# Patient Record
Sex: MALE | Race: BLACK OR AFRICAN AMERICAN | NOT HISPANIC OR LATINO | Employment: UNEMPLOYED | URBAN - METROPOLITAN AREA
[De-identification: names, ages, dates, MRNs, and addresses within clinical notes are randomized per-mention and may not be internally consistent; named-entity substitution may affect disease eponyms.]

---

## 2019-01-08 ENCOUNTER — HISTORICAL (OUTPATIENT)
Dept: RADIOLOGY | Facility: HOSPITAL | Age: 64
End: 2019-01-08

## 2019-05-22 ENCOUNTER — HISTORICAL (OUTPATIENT)
Dept: LAB | Facility: HOSPITAL | Age: 64
End: 2019-05-22

## 2019-05-22 LAB
ABS NEUT (OLG): 2.89 X10(3)/MCL (ref 2.1–9.2)
BASOPHILS # BLD AUTO: 0 X10(3)/MCL (ref 0–0.2)
BASOPHILS NFR BLD AUTO: 0 %
BUN SERPL-MCNC: 8.2 MG/DL (ref 7–18)
CALCIUM SERPL-MCNC: 9.4 MG/DL (ref 8.5–10.1)
CHLORIDE SERPL-SCNC: 104 MMOL/L (ref 98–107)
CO2 SERPL-SCNC: 27.4 MMOL/L (ref 21–32)
CREAT SERPL-MCNC: 0.8 MG/DL (ref 0.6–1.3)
CREAT/UREA NIT SERPL: 10
EOSINOPHIL # BLD AUTO: 0.2 X10(3)/MCL (ref 0–0.9)
EOSINOPHIL NFR BLD AUTO: 3 %
ERYTHROCYTE [DISTWIDTH] IN BLOOD BY AUTOMATED COUNT: 11.6 % (ref 11.5–17)
GLUCOSE SERPL-MCNC: 104 MG/DL (ref 74–106)
HCT VFR BLD AUTO: 39.7 % (ref 42–52)
HGB BLD-MCNC: 13.1 GM/DL (ref 14–18)
IMM GRANULOCYTES # BLD AUTO: 0.01 % (ref 0–0.02)
IMM GRANULOCYTES NFR BLD AUTO: 0.2 % (ref 0–0.43)
INR PPP: 0.92 (ref 2–3)
LYMPHOCYTES # BLD AUTO: 2.2 X10(3)/MCL (ref 0.6–4.6)
LYMPHOCYTES NFR BLD AUTO: 37 %
MCH RBC QN AUTO: 29.5 PG (ref 27–31)
MCHC RBC AUTO-ENTMCNC: 33 GM/DL (ref 33–36)
MCV RBC AUTO: 89.4 FL (ref 80–94)
MONOCYTES # BLD AUTO: 0.6 X10(3)/MCL (ref 0.1–1.3)
MONOCYTES NFR BLD AUTO: 11 %
NEUTROPHILS # BLD AUTO: 2.89 X10(3)/MCL (ref 1.4–7.9)
NEUTROPHILS NFR BLD AUTO: 48 %
PLATELET # BLD AUTO: 269 X10(3)/MCL (ref 130–400)
PMV BLD AUTO: 10.5 FL (ref 9.4–12.4)
POTASSIUM SERPL-SCNC: 4 MMOL/L (ref 3.5–5.1)
PROTHROMBIN TIME: 9.8 SECOND(S) (ref 9.3–11.4)
RBC # BLD AUTO: 4.44 X10(6)/MCL (ref 4.7–6.1)
SODIUM SERPL-SCNC: 140 MMOL/L (ref 136–145)
WBC # SPEC AUTO: 6 X10(3)/MCL (ref 4.5–11.5)

## 2019-06-10 ENCOUNTER — HISTORICAL (OUTPATIENT)
Dept: LAB | Facility: HOSPITAL | Age: 64
End: 2019-06-10

## 2019-06-10 LAB
ABS NEUT (OLG): 3.71 X10(3)/MCL (ref 2.1–9.2)
APTT PPP: 27.1 SECOND(S) (ref 24.5–34.9)
BASOPHILS # BLD AUTO: 0 X10(3)/MCL (ref 0–0.2)
BASOPHILS NFR BLD AUTO: 1 %
BUN SERPL-MCNC: 4.4 MG/DL (ref 7–18)
CALCIUM SERPL-MCNC: 9 MG/DL (ref 8.5–10.1)
CHLORIDE SERPL-SCNC: 102 MMOL/L (ref 98–107)
CO2 SERPL-SCNC: 27.4 MMOL/L (ref 21–32)
CREAT SERPL-MCNC: 0.69 MG/DL (ref 0.6–1.3)
CREAT/UREA NIT SERPL: 6
EOSINOPHIL # BLD AUTO: 0.2 X10(3)/MCL (ref 0–0.9)
EOSINOPHIL NFR BLD AUTO: 3 %
ERYTHROCYTE [DISTWIDTH] IN BLOOD BY AUTOMATED COUNT: 11.7 % (ref 11.5–17)
GLUCOSE SERPL-MCNC: 100 MG/DL (ref 74–106)
HCT VFR BLD AUTO: 37.1 % (ref 42–52)
HGB BLD-MCNC: 12.4 GM/DL (ref 14–18)
IMM GRANULOCYTES # BLD AUTO: 0.01 % (ref 0–0.02)
IMM GRANULOCYTES NFR BLD AUTO: 0.2 % (ref 0–0.43)
INR PPP: 0.89 (ref 2–3)
LYMPHOCYTES # BLD AUTO: 1.7 X10(3)/MCL (ref 0.6–4.6)
LYMPHOCYTES NFR BLD AUTO: 26 %
MCH RBC QN AUTO: 30 PG (ref 27–31)
MCHC RBC AUTO-ENTMCNC: 33.4 GM/DL (ref 33–36)
MCV RBC AUTO: 89.8 FL (ref 80–94)
MONOCYTES # BLD AUTO: 0.8 X10(3)/MCL (ref 0.1–1.3)
MONOCYTES NFR BLD AUTO: 12 %
NEUTROPHILS # BLD AUTO: 3.71 X10(3)/MCL (ref 1.4–7.9)
NEUTROPHILS NFR BLD AUTO: 57 %
PLATELET # BLD AUTO: 217 X10(3)/MCL (ref 130–400)
PMV BLD AUTO: 12 FL (ref 9.4–12.4)
POTASSIUM SERPL-SCNC: 3.6 MMOL/L (ref 3.5–5.1)
PROTHROMBIN TIME: 9.4 SECOND(S) (ref 9.3–11.4)
RBC # BLD AUTO: 4.13 X10(6)/MCL (ref 4.7–6.1)
SODIUM SERPL-SCNC: 141 MMOL/L (ref 136–145)
WBC # SPEC AUTO: 6.5 X10(3)/MCL (ref 4.5–11.5)

## 2020-03-03 ENCOUNTER — HISTORICAL (OUTPATIENT)
Dept: ADMINISTRATIVE | Facility: HOSPITAL | Age: 65
End: 2020-03-03

## 2020-03-09 ENCOUNTER — HISTORICAL (OUTPATIENT)
Dept: ADMINISTRATIVE | Facility: HOSPITAL | Age: 65
End: 2020-03-09

## 2021-03-31 ENCOUNTER — HISTORICAL (OUTPATIENT)
Dept: RADIOLOGY | Facility: HOSPITAL | Age: 66
End: 2021-03-31

## 2021-03-31 LAB
BUN SERPL-MCNC: 11.2 MG/DL (ref 8.4–25.7)
CREAT SERPL-MCNC: 0.82 MG/DL (ref 0.73–1.18)

## 2022-04-11 ENCOUNTER — HISTORICAL (OUTPATIENT)
Dept: ADMINISTRATIVE | Facility: HOSPITAL | Age: 67
End: 2022-04-11

## 2022-04-24 VITALS
SYSTOLIC BLOOD PRESSURE: 121 MMHG | DIASTOLIC BLOOD PRESSURE: 69 MMHG | BODY MASS INDEX: 32.58 KG/M2 | HEIGHT: 65 IN | WEIGHT: 195.56 LBS

## 2023-05-16 DIAGNOSIS — E27.9 DISORDER OF ADRENAL GLAND, UNSPECIFIED: Primary | ICD-10-CM

## 2023-05-23 ENCOUNTER — HOSPITAL ENCOUNTER (OUTPATIENT)
Dept: RADIOLOGY | Facility: HOSPITAL | Age: 68
Discharge: HOME | End: 2023-05-23
Attending: INTERNAL MEDICINE
Payer: OTHER GOVERNMENT

## 2023-05-23 DIAGNOSIS — E27.9 DISORDER OF ADRENAL GLAND, UNSPECIFIED: ICD-10-CM

## 2023-05-23 PROCEDURE — A9577 INJ MULTIHANCE: HCPCS

## 2023-05-23 PROCEDURE — 74183 MRI ABD W/O CNTR FLWD CNTR: CPT | Mod: TC

## 2023-05-23 PROCEDURE — 25500020 PHARM REV CODE 255

## 2023-05-23 RX ADMIN — GADOBENATE DIMEGLUMINE 20 ML: 529 INJECTION, SOLUTION INTRAVENOUS at 09:05

## 2024-04-24 DIAGNOSIS — Z87.891 HISTORY OF TOBACCO USE: Primary | ICD-10-CM

## 2024-05-10 ENCOUNTER — HOSPITAL ENCOUNTER (OUTPATIENT)
Dept: RADIOLOGY | Facility: HOSPITAL | Age: 69
Discharge: HOME | End: 2024-05-10
Attending: HOSPITALIST
Payer: OTHER GOVERNMENT

## 2024-05-10 DIAGNOSIS — Z87.891 HISTORY OF TOBACCO USE: ICD-10-CM

## 2024-05-10 PROCEDURE — 71271 CT THORAX LUNG CANCER SCR C-: CPT | Mod: TC

## 2024-11-02 RX ORDER — ASPIRIN 81 MG/1
81 TABLET ORAL DAILY
COMMUNITY
Start: 2024-04-19

## 2024-11-02 RX ORDER — LISINOPRIL 40 MG/1
40 TABLET ORAL DAILY
COMMUNITY
Start: 2024-04-19

## 2024-11-02 RX ORDER — AMLODIPINE BESYLATE 10 MG/1
10 TABLET ORAL DAILY
COMMUNITY
Start: 2024-04-19

## 2024-11-06 ENCOUNTER — ANESTHESIA EVENT (OUTPATIENT)
Facility: HOSPITAL | Age: 69
End: 2024-11-06
Payer: OTHER GOVERNMENT

## 2024-11-06 ENCOUNTER — ANESTHESIA (OUTPATIENT)
Facility: HOSPITAL | Age: 69
End: 2024-11-06
Payer: OTHER GOVERNMENT

## 2024-11-06 ENCOUNTER — HOSPITAL ENCOUNTER (OUTPATIENT)
Facility: HOSPITAL | Age: 69
Discharge: HOME | End: 2024-11-06
Attending: OPHTHALMOLOGY | Admitting: OPHTHALMOLOGY
Payer: OTHER GOVERNMENT

## 2024-11-06 VITALS
BODY MASS INDEX: 33.66 KG/M2 | HEART RATE: 64 BPM | TEMPERATURE: 98 F | DIASTOLIC BLOOD PRESSURE: 71 MMHG | HEIGHT: 65 IN | SYSTOLIC BLOOD PRESSURE: 134 MMHG | WEIGHT: 202 LBS | OXYGEN SATURATION: 98 %

## 2024-11-06 DIAGNOSIS — H26.9 CATARACT: ICD-10-CM

## 2024-11-06 PROBLEM — H25.12 AGE-RELATED NUCLEAR CATARACT OF LEFT EYE: Status: ACTIVE | Noted: 2024-11-06

## 2024-11-06 LAB — KOH PREP SPEC: ABNORMAL

## 2024-11-06 PROCEDURE — 37000008 HC ANESTHESIA 1ST 15 MINUTES: Performed by: OPHTHALMOLOGY

## 2024-11-06 PROCEDURE — 87220 TISSUE EXAM FOR FUNGI: CPT | Performed by: OPHTHALMOLOGY

## 2024-11-06 PROCEDURE — 71000015 HC POSTOP RECOV 1ST HR: Performed by: OPHTHALMOLOGY

## 2024-11-06 PROCEDURE — 36000706: Performed by: OPHTHALMOLOGY

## 2024-11-06 PROCEDURE — 25000003 PHARM REV CODE 250

## 2024-11-06 PROCEDURE — 63600175 PHARM REV CODE 636 W HCPCS: Performed by: NURSE ANESTHETIST, CERTIFIED REGISTERED

## 2024-11-06 PROCEDURE — 37000009 HC ANESTHESIA EA ADD 15 MINS: Performed by: OPHTHALMOLOGY

## 2024-11-06 PROCEDURE — V2632 POST CHMBR INTRAOCULAR LENS: HCPCS | Performed by: OPHTHALMOLOGY

## 2024-11-06 PROCEDURE — 25000003 PHARM REV CODE 250: Performed by: OPHTHALMOLOGY

## 2024-11-06 PROCEDURE — 36000707: Performed by: OPHTHALMOLOGY

## 2024-11-06 PROCEDURE — 27201423 OPTIME MED/SURG SUP & DEVICES STERILE SUPPLY: Performed by: OPHTHALMOLOGY

## 2024-11-06 PROCEDURE — 63600175 PHARM REV CODE 636 W HCPCS: Performed by: OPHTHALMOLOGY

## 2024-11-06 DEVICE — TECNIS 1-PC CLEAR MONO 6.0MM 21.5D
Type: IMPLANTABLE DEVICE | Site: EYE | Status: FUNCTIONAL
Brand: TECNIS IOL

## 2024-11-06 RX ORDER — PHENYLEPHRINE HYDROCHLORIDE 100 MG/ML
1 SOLUTION/ DROPS OPHTHALMIC
Status: COMPLETED | OUTPATIENT
Start: 2024-11-06 | End: 2024-11-06

## 2024-11-06 RX ORDER — MIDAZOLAM HYDROCHLORIDE 1 MG/ML
INJECTION INTRAMUSCULAR; INTRAVENOUS
Status: DISCONTINUED | OUTPATIENT
Start: 2024-11-06 | End: 2024-11-06

## 2024-11-06 RX ORDER — TROPICAMIDE 10 MG/ML
1 SOLUTION/ DROPS OPHTHALMIC
Status: COMPLETED | OUTPATIENT
Start: 2024-11-06 | End: 2024-11-06

## 2024-11-06 RX ORDER — EPINEPHRINE 1 MG/ML
INJECTION, SOLUTION, CONCENTRATE INTRAVENOUS
Status: DISCONTINUED | OUTPATIENT
Start: 2024-11-06 | End: 2024-11-06 | Stop reason: HOSPADM

## 2024-11-06 RX ORDER — BUPIVACAINE HYDROCHLORIDE 7.5 MG/ML
INJECTION, SOLUTION EPIDURAL; RETROBULBAR
Status: DISCONTINUED | OUTPATIENT
Start: 2024-11-06 | End: 2024-11-06 | Stop reason: HOSPADM

## 2024-11-06 RX ORDER — LIDOCAINE HYDROCHLORIDE 10 MG/ML
INJECTION, SOLUTION EPIDURAL; INFILTRATION; INTRACAUDAL; PERINEURAL
Status: DISCONTINUED | OUTPATIENT
Start: 2024-11-06 | End: 2024-11-06 | Stop reason: HOSPADM

## 2024-11-06 RX ORDER — POVIDONE-IODINE 5 %
SOLUTION, NON-ORAL OPHTHALMIC (EYE)
Status: DISCONTINUED | OUTPATIENT
Start: 2024-11-06 | End: 2024-11-06 | Stop reason: HOSPADM

## 2024-11-06 RX ORDER — BUPIVACAINE HYDROCHLORIDE 7.5 MG/ML
0.1 INJECTION, SOLUTION EPIDURAL; RETROBULBAR
Status: COMPLETED | OUTPATIENT
Start: 2024-11-06 | End: 2024-11-06

## 2024-11-06 RX ADMIN — BUPIVACAINE HYDROCHLORIDE 0.75 MG: 7.5 INJECTION, SOLUTION EPIDURAL; RETROBULBAR at 08:11

## 2024-11-06 RX ADMIN — TROPICAMIDE 1 DROP: 10 SOLUTION/ DROPS OPHTHALMIC at 08:11

## 2024-11-06 RX ADMIN — PHENYLEPHRINE HYDROCHLORIDE 1 DROP: 100 SOLUTION/ DROPS OPHTHALMIC at 08:11

## 2024-11-06 RX ADMIN — MIDAZOLAM 2 MG: 1 INJECTION INTRAMUSCULAR; INTRAVENOUS at 10:11

## 2024-11-06 NOTE — OP NOTE
Date of Procedure: 11/6/2024     Procedure: Procedure(s) (LRB):  PHACOEMULSIFICATION, CATARACT, WITH IOL INSERTION  ////left eye (Left)     Surgeons and Role:     * Mor Miller II, MD - Primary    Pre-Operative Diagnosis: Nuclear sclerotic cataract of left eye [H25.12]    Post-Operative Diagnosis: Post-Op Diagnosis Codes:     * Nuclear sclerotic cataract of left eye [H25.12]    Anesthesia: Monitor Anesthesia Care    Operative Findings (including complications, if any): None    Description of Technical Procedures:     Dilating drops were given in the holding area. The patient was brought into the surgical suite, identified and correct eye confirmed. Topical anesthesia was applied. The eye was then prepped and draped in a sterile fashion. A supersharp blade was used to make a paracentesis at the 2 o'clock position. 1/2 CC of 2% Lidocaine + 1/2 CC BSS was injected into AC thru cannula. Viscoelastic was placed in the anterior chamber. A clear corneal incision was made at the 3 o'clock position with a keratome blade. Next, a cystotome and utrata forceps were used to make a 360 degree capsulorrhexis. BSS thru a cannula was used to hydro dissect and rotate the lens material from the capsule. The phaco handpiece was placed into the anterior chamber and the lens was removed in a divide an conquer fashion. The remaining cortex was removed with the I & A handpiece. Viscoelastic was placed into the capsular bag, which remained clear and intact. An IOL was placed in the bag and rotated into position. The remaining viscoelastic was removed with the I&A handpiece. The incision was hydrated with BSS and checked for leakage. No leakage was found. The drapes were removed and antibiotic drops were placed into the eye. The patient tolerated the procedure well and was moved back to the holding room. Sunglasses and instructions were given to the patient and family. The patient will follow-up at my office tomorrow.    EFX:7.3  Lens:  21.5 [id number]

## 2024-11-06 NOTE — ANESTHESIA PREPROCEDURE EVALUATION
11/06/2024  Corona Moon is a 69 y.o., male.      Pre-op Assessment    I have reviewed the Patient Summary Reports.     I have reviewed the Nursing Notes. I have reviewed the NPO Status.   I have reviewed the Medications.     Review of Systems  Anesthesia Hx:               Denies Personal Hx of Anesthesia complications.                    Cardiovascular:     Hypertension                                    Hypertension         Pulmonary:        Sleep Apnea     Obstructive Sleep Apnea (PETRA).           Hepatic/GI:  Hepatic/GI Normal                    Neurological:  Neurology Normal                                      Endocrine:        Obesity / BMI > 30  Psych:  Psychiatric Normal                    Physical Exam  General: Well nourished, Cooperative and Alert    Airway:  Mallampati: II   Mouth Opening: Normal  TM Distance: Normal  Tongue: Normal    Dental:  Intact    Chest/Lungs:  Normal Respiratory Rate    Heart:  Rate: Normal        Anesthesia Plan  Type of Anesthesia, risks & benefits discussed:    Anesthesia Type: MAC  Intra-op Monitoring Plan: Standard ASA Monitors  Post Op Pain Control Plan: IV/PO Opioids PRN  (medical reason for not using multimodal pain management)  Induction:  IV  Informed Consent: Informed consent signed with the Patient and all parties understand the risks and agree with anesthesia plan.  All questions answered.   ASA Score: 2  Day of Surgery Review of History & Physical: H&P Update referred to the surgeon/provider.    Ready For Surgery From Anesthesia Perspective.     .

## 2024-11-06 NOTE — DISCHARGE SUMMARY
[facility] Surgical - Periop Services 2nd Floor  Discharge Note  Short Stay    Procedure(s) (LRB):  PHACOEMULSIFICATION, CATARACT, WITH IOL INSERTION  ////left eye (Left)     OUTCOME: Patient tolerated treatment/procedure well without complication and is now ready for discharge.    DISPOSITION: Home or Self Care    FINAL DIAGNOSIS:  Pseudophakia Z96.1    FOLLOWUP: Tomorrow @ Dr. Miller's Office    DISCHARGE INSTRUCTIONS:   To Recovery and when criteria met discharge to home  Surgery: Refer to operative note  IV: D/C per anesthesia   Diet: Advance as tolerated  Activity: light duty, no outside work, no lifting, sleep with shield  Meds: Follow post-op instruction sheet provided (antibiotic, steroid, & anti-inflammatory gtts per Dr. Miller)   Resume all medicine from home

## 2024-11-06 NOTE — TRANSFER OF CARE
"Anesthesia Transfer of Care Note    Patient: Corona Moon    Procedure(s) Performed: Procedure(s) (LRB):  PHACOEMULSIFICATION, CATARACT, WITH IOL INSERTION  ////left eye (Left)    Patient location: PACU    Anesthesia Type: MAC    Transport from OR: Transported from OR on room air with adequate spontaneous ventilation    Post pain: adequate analgesia    Post assessment: no apparent anesthetic complications    Post vital signs: stable    Level of consciousness: awake    Nausea/Vomiting: no nausea/vomiting    Complications: none    Transfer of care protocol was followed      Last vitals: Visit Vitals  BP (!) 144/67   Pulse 64   Temp 36.4 °C (97.6 °F) (Oral)   Ht 5' 5" (1.651 m)   Wt 91.6 kg (202 lb)   SpO2 98%   BMI 33.61 kg/m²     "

## 2024-11-06 NOTE — ANESTHESIA POSTPROCEDURE EVALUATION
Anesthesia Post Evaluation    Patient: Corona Moon    Procedure(s) Performed: Procedure(s) (LRB):  PHACOEMULSIFICATION, CATARACT, WITH IOL INSERTION  ////left eye (Left)    Final Anesthesia Type: MAC      Patient location during evaluation: OPS  Patient participation: Yes- Able to Participate  Level of consciousness: awake and alert  Post-procedure vital signs: reviewed and stable  Pain management: adequate  Airway patency: patent    PONV status at discharge: No PONV  Anesthetic complications: no      Cardiovascular status: blood pressure returned to baseline  Respiratory status: unassisted, room air and spontaneous ventilation  Hydration status: euvolemic  Follow-up not needed.              Vitals Value Taken Time   /72 11/06/24 0834   Temp 36.4 °C (97.6 °F) 11/06/24 0833   Pulse 64 11/06/24 0833   Resp 16 11/06/24 1025   SpO2 98 % 11/06/24 0833         No case tracking events are documented in the log.      Pain/Dalton Score: No data recorded

## 2025-06-03 DIAGNOSIS — R22.9 LOCALIZED SUPERFICIAL SWELLING, MASS, OR LUMP: Primary | ICD-10-CM

## 2025-06-05 ENCOUNTER — HOSPITAL ENCOUNTER (OUTPATIENT)
Dept: RADIOLOGY | Facility: HOSPITAL | Age: 70
Discharge: HOME | End: 2025-06-05
Attending: NURSE PRACTITIONER
Payer: OTHER GOVERNMENT

## 2025-06-05 DIAGNOSIS — R22.9 LOCALIZED SUPERFICIAL SWELLING, MASS, OR LUMP: ICD-10-CM

## 2025-06-05 PROCEDURE — 76536 US EXAM OF HEAD AND NECK: CPT | Mod: TC

## (undated) DEVICE — Device

## (undated) DEVICE — GLOVE SENSICARE PI MICRO 7.5

## (undated) DEVICE — PACK CASSETTE TUBE ELLIPS FX

## (undated) DEVICE — SYR 3ML LL 18GA 1.5IN

## (undated) DEVICE — BAG RECLOSABLE 2MIL 4X6IN

## (undated) DEVICE — TAPE CURAD PAPER 1INX1.5YD

## (undated) DEVICE — TIP I & A

## (undated) DEVICE — TIP PHACO 30 DEG BEVEL 21GA

## (undated) DEVICE — SYR W NDL BLN FILL 5ML 18GX1.5

## (undated) DEVICE — CARTRIDGE SHOOTER

## (undated) DEVICE — PACK EYE LEONI DISPOSABLE

## (undated) DEVICE — GOWN POLY REINF BRTH SLV XL

## (undated) DEVICE — ADAPTER DUAL NSL LUER M-M 7FT